# Patient Record
Sex: MALE | Race: BLACK OR AFRICAN AMERICAN | Employment: PART TIME | URBAN - METROPOLITAN AREA
[De-identification: names, ages, dates, MRNs, and addresses within clinical notes are randomized per-mention and may not be internally consistent; named-entity substitution may affect disease eponyms.]

---

## 2022-01-22 ENCOUNTER — HOSPITAL ENCOUNTER (EMERGENCY)
Age: 27
Discharge: HOME | End: 2022-01-22
Attending: EMERGENCY MEDICINE
Payer: COMMERCIAL

## 2022-01-22 ENCOUNTER — APPOINTMENT (OUTPATIENT)
Dept: GENERAL RADIOLOGY | Age: 27
End: 2022-01-22
Payer: COMMERCIAL

## 2022-01-22 VITALS
HEART RATE: 62 BPM | RESPIRATION RATE: 17 BRPM | WEIGHT: 130 LBS | BODY MASS INDEX: 23.04 KG/M2 | DIASTOLIC BLOOD PRESSURE: 72 MMHG | SYSTOLIC BLOOD PRESSURE: 119 MMHG | TEMPERATURE: 97.1 F | OXYGEN SATURATION: 100 % | HEIGHT: 63 IN

## 2022-01-22 DIAGNOSIS — R07.89 CHEST WALL PAIN: Primary | ICD-10-CM

## 2022-01-22 LAB
ABSOLUTE EOS #: 0.04 K/UL (ref 0–0.44)
ABSOLUTE IMMATURE GRANULOCYTE: <0.03 K/UL (ref 0–0.3)
ABSOLUTE LYMPH #: 2.23 K/UL (ref 1.1–3.7)
ABSOLUTE MONO #: 0.29 K/UL (ref 0.1–1.2)
ANION GAP SERPL CALCULATED.3IONS-SCNC: 15 MMOL/L (ref 9–17)
BASOPHILS # BLD: 1 % (ref 0–2)
BASOPHILS ABSOLUTE: 0.03 K/UL (ref 0–0.2)
BUN BLDV-MCNC: 14 MG/DL (ref 6–20)
BUN/CREAT BLD: ABNORMAL (ref 9–20)
CALCIUM SERPL-MCNC: 9.1 MG/DL (ref 8.6–10.4)
CHLORIDE BLD-SCNC: 108 MMOL/L (ref 98–107)
CO2: 17 MMOL/L (ref 20–31)
CREAT SERPL-MCNC: 0.88 MG/DL (ref 0.7–1.2)
DIFFERENTIAL TYPE: ABNORMAL
EOSINOPHILS RELATIVE PERCENT: 1 % (ref 1–4)
GFR AFRICAN AMERICAN: >60 ML/MIN
GFR NON-AFRICAN AMERICAN: >60 ML/MIN
GFR SERPL CREATININE-BSD FRML MDRD: ABNORMAL ML/MIN/{1.73_M2}
GFR SERPL CREATININE-BSD FRML MDRD: ABNORMAL ML/MIN/{1.73_M2}
GLUCOSE BLD-MCNC: 100 MG/DL (ref 70–99)
HCT VFR BLD CALC: 46.7 % (ref 40.7–50.3)
HEMOGLOBIN: 14.4 G/DL (ref 13–17)
IMMATURE GRANULOCYTES: 0 %
LYMPHOCYTES # BLD: 50 % (ref 24–43)
MCH RBC QN AUTO: 30 PG (ref 25.2–33.5)
MCHC RBC AUTO-ENTMCNC: 30.8 G/DL (ref 28.4–34.8)
MCV RBC AUTO: 97.3 FL (ref 82.6–102.9)
MONOCYTES # BLD: 7 % (ref 3–12)
NRBC AUTOMATED: 0 PER 100 WBC
PDW BLD-RTO: 12 % (ref 11.8–14.4)
PLATELET # BLD: 288 K/UL (ref 138–453)
PLATELET ESTIMATE: ABNORMAL
PMV BLD AUTO: 9.1 FL (ref 8.1–13.5)
POTASSIUM SERPL-SCNC: 3.9 MMOL/L (ref 3.7–5.3)
RBC # BLD: 4.8 M/UL (ref 4.21–5.77)
RBC # BLD: ABNORMAL 10*6/UL
SARS-COV-2: NORMAL
SARS-COV-2: NOT DETECTED
SEG NEUTROPHILS: 41 % (ref 36–65)
SEGMENTED NEUTROPHILS ABSOLUTE COUNT: 1.81 K/UL (ref 1.5–8.1)
SODIUM BLD-SCNC: 140 MMOL/L (ref 135–144)
SOURCE: NORMAL
TROPONIN INTERP: NORMAL
TROPONIN T: NORMAL NG/ML
TROPONIN, HIGH SENSITIVITY: <6 NG/L (ref 0–22)
WBC # BLD: 4.4 K/UL (ref 3.5–11.3)
WBC # BLD: ABNORMAL 10*3/UL

## 2022-01-22 PROCEDURE — U0005 INFEC AGEN DETEC AMPLI PROBE: HCPCS

## 2022-01-22 PROCEDURE — 84484 ASSAY OF TROPONIN QUANT: CPT

## 2022-01-22 PROCEDURE — 99283 EMERGENCY DEPT VISIT LOW MDM: CPT

## 2022-01-22 PROCEDURE — U0003 INFECTIOUS AGENT DETECTION BY NUCLEIC ACID (DNA OR RNA); SEVERE ACUTE RESPIRATORY SYNDROME CORONAVIRUS 2 (SARS-COV-2) (CORONAVIRUS DISEASE [COVID-19]), AMPLIFIED PROBE TECHNIQUE, MAKING USE OF HIGH THROUGHPUT TECHNOLOGIES AS DESCRIBED BY CMS-2020-01-R: HCPCS

## 2022-01-22 PROCEDURE — 85025 COMPLETE CBC W/AUTO DIFF WBC: CPT

## 2022-01-22 PROCEDURE — 71045 X-RAY EXAM CHEST 1 VIEW: CPT

## 2022-01-22 PROCEDURE — 93005 ELECTROCARDIOGRAM TRACING: CPT | Performed by: STUDENT IN AN ORGANIZED HEALTH CARE EDUCATION/TRAINING PROGRAM

## 2022-01-22 PROCEDURE — 80048 BASIC METABOLIC PNL TOTAL CA: CPT

## 2022-01-22 RX ORDER — IBUPROFEN 800 MG/1
800 TABLET ORAL ONCE
Status: DISCONTINUED | OUTPATIENT
Start: 2022-01-22 | End: 2022-01-22 | Stop reason: HOSPADM

## 2022-01-22 RX ORDER — LIDOCAINE 50 MG/G
1 PATCH TOPICAL DAILY
Qty: 30 PATCH | Refills: 0 | Status: SHIPPED | OUTPATIENT
Start: 2022-01-22

## 2022-01-22 RX ORDER — METHOCARBAMOL 500 MG/1
500 TABLET, FILM COATED ORAL 3 TIMES DAILY
Qty: 15 TABLET | Refills: 0 | Status: SHIPPED | OUTPATIENT
Start: 2022-01-22 | End: 2022-01-27

## 2022-01-22 RX ORDER — IBUPROFEN 800 MG/1
800 TABLET ORAL EVERY 6 HOURS PRN
Qty: 21 TABLET | Refills: 0 | Status: SHIPPED | OUTPATIENT
Start: 2022-01-22

## 2022-01-22 RX ORDER — LIDOCAINE 4 G/G
1 PATCH TOPICAL DAILY
Status: DISCONTINUED | OUTPATIENT
Start: 2022-01-22 | End: 2022-01-22 | Stop reason: HOSPADM

## 2022-01-22 ASSESSMENT — ENCOUNTER SYMPTOMS
VOMITING: 0
SHORTNESS OF BREATH: 0
COUGH: 0
NAUSEA: 0
RHINORRHEA: 0
DIARRHEA: 0
CONSTIPATION: 0
ABDOMINAL PAIN: 0
BACK PAIN: 0
SORE THROAT: 0

## 2022-01-22 ASSESSMENT — PAIN DESCRIPTION - DESCRIPTORS: DESCRIPTORS: ACHING

## 2022-01-22 ASSESSMENT — PAIN DESCRIPTION - PAIN TYPE: TYPE: ACUTE PAIN

## 2022-01-22 ASSESSMENT — PAIN DESCRIPTION - LOCATION: LOCATION: CHEST

## 2022-01-22 ASSESSMENT — PAIN DESCRIPTION - FREQUENCY: FREQUENCY: INTERMITTENT

## 2022-01-22 ASSESSMENT — PAIN SCALES - GENERAL: PAINLEVEL_OUTOF10: 5

## 2022-01-22 ASSESSMENT — PAIN DESCRIPTION - PROGRESSION: CLINICAL_PROGRESSION: NOT CHANGED

## 2022-01-22 NOTE — ED PROVIDER NOTES
[facility]  ED  Emergency Department Encounter  Emergency Medicine Resident     Pt Name: Kaia Ferris  MRN: [id number]  [place] 1995  Date of evaluation: 1/22/22  PCP:  No primary care provider on file. CHIEF COMPLAINT       Chief Complaint   Patient presents with    Chest Pain     X 4 weeks       HISTORY OFPRESENT ILLNESS  (Location/Symptom, Timing/Onset, Context/Setting, Quality, Duration, Modifying Dai Santiago.)      Kaia Ferris is a 27 y.o. male with no significant past medical history who presents with anterior midsternal chest pain described as a pressure with intermittent sharp stabbing pains that initially began 4 weeks ago. Patient states pain has been intermittent and he has not been treating himself with anything at home. However, today pain was much more severe and occurred with activity while at work. Patient denies associated diaphoresis, nausea, vomiting, shortness of breath. He denies fever, chills, rhinorrhea, congestion, dental pain, diarrhea or urinary symptoms. Patient does report recent sick contact with his family who tested positive for COVID, however he did a home rapid test which was negative. He denies history of DVT/PE, cancer, exogenous hormone use, smoking, surgery or prolonged immobilization. PAST MEDICAL / SURGICAL / SOCIAL / FAMILY HISTORY     Patient denies past medical or surgical history    Social:  reports that he has never smoked. He has never used smokeless tobacco. He reports previous alcohol use. He reports previous drug use. Family Hx: No family history on file. Allergies:  Patient has no known allergies. Home Medications:  Prior to Admission medications    Medication Sig Start Date End Date Taking?  Authorizing Provider   ibuprofen (IBU) 800 MG tablet Take 1 tablet by mouth every 6 hours as needed for Pain 1/22/22  Yes Rosalba Becerra, DO   methocarbamol (ROBAXIN) 500 MG tablet Take 1 tablet by mouth 3 times daily for 5 days 1/22/22 1/27/22 Yes mIan Park, DO   lidocaine (LIDODERM) 5 % Place 1 patch onto the skin daily 12 hours on, 12 hours off. 1/22/22  Yes Iman Park, DO       REVIEW OFSYSTEMS    (2-9 systems for level 4, 10 or more for level 5)      Review of Systems   Constitutional: Negative for chills and fever. HENT: Negative for congestion, rhinorrhea and sore throat. Respiratory: Negative for cough and shortness of breath. Cardiovascular: Positive for chest pain. Negative for leg swelling. Gastrointestinal: Negative for abdominal pain, constipation, diarrhea, nausea and vomiting. Genitourinary: Negative for decreased urine volume and hematuria. Musculoskeletal: Negative for arthralgias, back pain and neck pain. Skin: Negative for rash. Neurological: Negative for dizziness, numbness and headaches. All other systems reviewed and are negative. PHYSICAL EXAM   (up to 7 for level 4, 8 or more forlevel 5)      INITIAL VITALS:   Vitals:    01/22/22 0113   BP: 119/72   Pulse: 62   Resp: 17   Temp: 97.1 °F (36.2 °C)   TempSrc: Oral   SpO2: 100%   Weight: 130 lb (59 kg)   Height: 5' 3\" (1.6 m)        Physical Exam  Vitals and nursing note reviewed. Constitutional:       General: He is not in acute distress. Appearance: He is not toxic-appearing. Comments: Adult male sitting in stretcher no acute distress. He speaks in full sentences and answers questions appropriately    HENT:      Head: Normocephalic and atraumatic. Nose: Nose normal.      Mouth/Throat:      Mouth: Mucous membranes are moist.      Pharynx: Oropharynx is clear. Eyes:      Conjunctiva/sclera: Conjunctivae normal.      Pupils: Pupils are equal, round, and reactive to light. Cardiovascular:      Rate and Rhythm: Normal rate and regular rhythm. Pulses: Normal pulses. Heart sounds: No murmur heard. No friction rub. No gallop. Pulmonary:      Effort: Pulmonary effort is normal. No respiratory distress. Breath sounds: Normal breath sounds. No wheezing, rhonchi or rales. Abdominal:      General: There is no distension. Palpations: Abdomen is soft. Tenderness: There is no abdominal tenderness. Musculoskeletal:      Cervical back: Neck supple. No rigidity. Right lower leg: No edema. Left lower leg: No edema. Skin:     General: Skin is warm and dry. Capillary Refill: Capillary refill takes less than 2 seconds. Findings: No rash. Neurological:      Mental Status: He is alert. Psychiatric:         Mood and Affect: Mood normal.         Behavior: Behavior normal.         DIFFERENTIAL  DIAGNOSIS     DDX: Musculoskeletal pain, ACS, MI, pneumonia, pneumothorax, electrolyte abnormality, dehydration, COVID-19, other viral illness    Initial MDM/Plan: 27 y.o. male with no significant past medical history who presents with anterior midsternal chest pain described as a pressure with intermittent sharp stabbing pains that initially began 4 weeks ago and worsened last night while at work. No associated symptoms. On physical exam, patient is nontoxic-appearing with normal vital signs. Cardiopulmonary exam is benign. No tenderness to palpation of anterior and posterior chest wall. Suspect musculoskeletal pain, however will obtain cardiac work-up to evaluate for ACS. Patient also had recent COVID exposure.   Will obtain COVID test.    DIAGNOSTIC RESULTS / EMERGENCYDEPARTMENT COURSE / MDM     LABS:  Results for orders placed or performed during the hospital encounter of 83/11/76   Basic Metabolic Panel w/ Reflex to MG   Result Value Ref Range    Glucose 100 (H) 70 - 99 mg/dL    BUN 14 6 - 20 mg/dL    CREATININE 0.88 0.70 - 1.20 mg/dL    Bun/Cre Ratio NOT REPORTED 9 - 20    Calcium 9.1 8.6 - 10.4 mg/dL    Sodium 140 135 - 144 mmol/L    Potassium 3.9 3.7 - 5.3 mmol/L    Chloride 108 (H) 98 - 107 mmol/L    CO2 17 (L) 20 - 31 mmol/L    Anion Gap 15 9 - 17 mmol/L    GFR Non-African American >60 >60 mL/min    GFR African American >60 >60 mL/min    GFR Comment          GFR Staging NOT REPORTED    CBC Auto Differential   Result Value Ref Range    WBC 4.4 3.5 - 11.3 k/uL    RBC 4.80 4.21 - 5.77 m/uL    Hemoglobin 14.4 13.0 - 17.0 g/dL    Hematocrit 46.7 40.7 - 50.3 %    MCV 97.3 82.6 - 102.9 fL    MCH 30.0 25.2 - 33.5 pg    MCHC 30.8 28.4 - 34.8 g/dL    RDW 12.0 11.8 - 14.4 %    Platelets 856 134 - 755 k/uL    MPV 9.1 8.1 - 13.5 fL    NRBC Automated 0.0 0.0 per 100 WBC    Differential Type NOT REPORTED     Seg Neutrophils 41 36 - 65 %    Lymphocytes 50 (H) 24 - 43 %    Monocytes 7 3 - 12 %    Eosinophils % 1 1 - 4 %    Basophils 1 0 - 2 %    Immature Granulocytes 0 0 %    Segs Absolute 1.81 1.50 - 8.10 k/uL    Absolute Lymph # 2.23 1.10 - 3.70 k/uL    Absolute Mono # 0.29 0.10 - 1.20 k/uL    Absolute Eos # 0.04 0.00 - 0.44 k/uL    Basophils Absolute 0.03 0.00 - 0.20 k/uL    Absolute Immature Granulocyte <0.03 0.00 - 0.30 k/uL    WBC Morphology NOT REPORTED     RBC Morphology NOT REPORTED     Platelet Estimate NOT REPORTED    Troponin   Result Value Ref Range    Troponin, High Sensitivity <6 0 - 22 ng/L    Troponin T NOT REPORTED <0.03 ng/mL    Troponin Interp NOT REPORTED    COVID-19    Specimen: Nasopharyngeal Swab   Result Value Ref Range    SARS-CoV-2          Source . NASOPHARYNGEAL SWAB     SARS-CoV-2 Not Detected Not Detected   EKG 12 Lead   Result Value Ref Range    Ventricular Rate 52 BPM    Atrial Rate 52 BPM    P-R Interval 158 ms    QRS Duration 90 ms    Q-T Interval 402 ms    QTc Calculation (Bazett) 373 ms    P Axis 55 degrees    R Axis 60 degrees    T Axis 54 degrees         RADIOLOGY:  XR CHEST PORTABLE    Result Date: 1/22/2022  EXAMINATION: ONE XRAY VIEW OF THE CHEST 1/22/2022 2:39 am COMPARISON: None.  HISTORY: ORDERING SYSTEM PROVIDED HISTORY: chest pain TECHNOLOGIST PROVIDED HISTORY: chest pain Reason for Exam: upr,chest pain r.o covid FINDINGS: The cardiac silhouette and mediastinal contours are normal.  The lungs are clear. No parenchymal lung infiltrate. No pleural effusion. The visualized osseous structures are unremarkable. 1. No acute cardiopulmonary disease. EKG  Sinus bradycardia, rate: 52  WA: 158  QRS: 90  QT/QTc: 402/373  No ST elevation or depression  No T wave abnormality    All EKG's are interpreted by the Emergency Department Physician who either signs or Co-signs this chart in the absence of a cardiologist.      MEDICATIONS ORDERED:  Orders Placed This Encounter   Medications    DISCONTD: ibuprofen (ADVIL;MOTRIN) tablet 800 mg    DISCONTD: lidocaine 4 % external patch 1 patch    ibuprofen (IBU) 800 MG tablet     Sig: Take 1 tablet by mouth every 6 hours as needed for Pain     Dispense:  21 tablet     Refill:  0    methocarbamol (ROBAXIN) 500 MG tablet     Sig: Take 1 tablet by mouth 3 times daily for 5 days     Dispense:  15 tablet     Refill:  0    lidocaine (LIDODERM) 5 %     Sig: Place 1 patch onto the skin daily 12 hours on, 12 hours off. Dispense:  30 patch     Refill:  0         PROCEDURES:  None      CONSULTS:  None      EMERGENCY DEPARTMENT COURSE:  ED Course as of 01/22/22 1435   Sat Jan 22, 2022   0333 XR CHEST PORTABLE  No acute cardiopulmonary disease. [KA]     0402 Troponin, High Sensitivity: <6 [KA]     0402 WBC: 4.4 [KA]     0402 Work-up is reassuring. Patient is stable for discharge at this time. Prescriptions for ibuprofen, lidocaine patches and Robaxin provided. Patient was referred to a primary care physician whom he can follow-up with. Work note was also provided. He was instructed to return to emergency department for any worsening symptoms. [KA]      ED Course User Index  [KA] Darnell Medellin DO          FINAL IMPRESSION      1.  Chest wall pain         DISPOSITION / PLAN     DISPOSITION Decision To Discharge 01/22/2022 04:02:49 AM      PATIENT REFERRED TO:  [facility]  [address]   [facility] [address]  [phone]  Schedule an appointment as soon as possible for a visit   Primary care    [facility] ED  [address] [place] [address]  [phone]    If symptoms worsen      DISCHARGE MEDICATIONS:  Discharge Medication List as of 1/22/2022  4:17 AM      START taking these medications    Details   ibuprofen (IBU) 800 MG tablet Take 1 tablet by mouth every 6 hours as needed for Pain, Disp-21 tablet, R-0Print      methocarbamol (ROBAXIN) 500 MG tablet Take 1 tablet by mouth 3 times daily for 5 days, Disp-15 tablet, R-0Print      lidocaine (LIDODERM) 5 % Place 1 patch onto the skin daily 12 hours on, 12 hours off., Disp-30 patch, R-0Print             Ilene Hull DO  Emergency Medicine Resident    (Please note that portions of this note were completed with a voice recognition program.Efforts were made to edit the dictations but occasionally words are mis-transcribed.)        Ilene Hull DO  Resident  01/22/22 5826

## 2022-01-22 NOTE — Clinical Note
Leatha Gonzales was seen and treated in our emergency department on 1/22/2022. He may return to work on 01/22/2022. Please excuse him from work 1/21/22     If you have any questions or concerns, please don't hesitate to call.       Rivera Cullen, DO

## 2022-01-22 NOTE — Clinical Note
Ha Rehman was seen and treated in our emergency department on 1/22/2022. He may return to work on 01/22/2022. Please excuse him from work 1/21/22     If you have any questions or concerns, please don't hesitate to call.       Judd Sosa, DO

## 2022-01-24 LAB
EKG ATRIAL RATE: 52 BPM
EKG P AXIS: 55 DEGREES
EKG P-R INTERVAL: 158 MS
EKG Q-T INTERVAL: 402 MS
EKG QRS DURATION: 90 MS
EKG QTC CALCULATION (BAZETT): 373 MS
EKG R AXIS: 60 DEGREES
EKG T AXIS: 54 DEGREES
EKG VENTRICULAR RATE: 52 BPM

## 2022-01-24 PROCEDURE — 93010 ELECTROCARDIOGRAM REPORT: CPT | Performed by: INTERNAL MEDICINE

## 2022-01-26 NOTE — ED PROVIDER NOTES
[facility] ED  Emergency Department  Faculty Attestation       I performed a history and physical examination of the patient and discussed management with the resident. I reviewed the residents note and agree with the documented findings including all diagnostic interpretations and plan of care. Any areas of disagreement are noted on the chart. I was personally present for the key portions of any procedures. I have documented in the chart those procedures where I was not present during the key portions. I have reviewed the emergency nurses triage note. I agree with the chief complaint, past medical history, past surgical history, allergies, medications, social and family history as documented unless otherwise noted below. Documentation of the HPI, Physical Exam and Medical Decision Making performed by carina is based on my personal performance of the HPI, PE and MDM. For Physician Assistant/ Nurse Practitioner cases/documentation I have personally evaluated this patient and have completed at least one if not all key elements of the E/M (history, physical exam, and MDM). Additional findings are as noted. Pertinent Comments     Primary Care Physician: No primary care provider on file. History: This is a 27 y.o. male who presents to the Emergency Department with complaint of midsternal chest pain that started while exerting himself at work today. Patient states he has been having some intermittent chest pain that started approximately 1 month ago, however this has just been a brief sharp stabbing sensation. Today the pain was midsternal and severe in nature and associated with being at work. Did self resolve. Denies any diaphoresis, shortness of breath, nausea, or vomiting. Denies any sick contacts or viral-like symptoms. The patient denies any hemoptysis. No unilateral calf swelling. No history of previous DVTs. No recent travel, prolonged immobilization, or surgeries.   No active malignancies. No exogenous estrogen use. Physical:    ED Triage Vitals [01/22/22 0113]   BP Temp Temp Source Pulse Resp SpO2 Height Weight   119/72 97.1 °F (36.2 °C) Oral 62 17 100 % 5' 3\" (1.6 m) 130 lb (59 kg)        General: alert, well appearing, and in no distress. Talking in full sentences. HENT: normocephalic, moist mucus membranes  Eyes: pupils equal and reactive, extraocular eye movements intact   Neck: normal ROM, trachea midline   Heart:  normal rate, regular rhythm, normal S1, S2, no murmurs, rubs, clicks or gallops   Chest: clear to auscultation, no wheezes, rales or rhonchi, symmetric air entry. Abdomen: soft, nontender, nondistended, no masses or organomegaly  no pulsatile masses   Extremities: no obvious deformities, normal ROM of all 4 extremities, no edema of bilateral lower extremities  Neurological: alert, oriented, normal speech, no focal findings or movement disorder noted   Skin: normal coloration and turgor, no rashes on exposed skin       MDM/Plan:   57-year-old male coming in with midsternal chest pain. No significant risk factors for ACS. However given that his pain has been going on for a month and then today was associated with exertion, cannot fully rule out ACS and therefore we will check an EKG, troponin, and chest x-ray. Patient did have a negative home CoVID-19 test, but also given his symptoms and current peak of CoVID-19 will get swab. Low suspicion of PE and no further work-up needed per [facility] criteria. If work-up negative likely plan for discharge home    EKG Interpretation    Interpreted by emergency department physician    Clinical Impression: Sinus arron with nonspecific J point elevation consistent with possible benign early repol. No prior EKGs.      Sarah Garcia MD      Critical Care Time: None     Sarah Garcia MD  Attending Emergency Physician        Sarah Garcia MD  01/26/22 3875